# Patient Record
Sex: MALE | Race: WHITE | NOT HISPANIC OR LATINO
[De-identification: names, ages, dates, MRNs, and addresses within clinical notes are randomized per-mention and may not be internally consistent; named-entity substitution may affect disease eponyms.]

---

## 2024-04-22 PROBLEM — Z00.00 ENCOUNTER FOR PREVENTIVE HEALTH EXAMINATION: Status: ACTIVE | Noted: 2024-04-22

## 2024-04-23 ENCOUNTER — TRANSCRIPTION ENCOUNTER (OUTPATIENT)
Age: 29
End: 2024-04-23

## 2024-04-23 ENCOUNTER — APPOINTMENT (OUTPATIENT)
Dept: ORTHOPEDIC SURGERY | Facility: CLINIC | Age: 29
End: 2024-04-23
Payer: COMMERCIAL

## 2024-04-23 VITALS
BODY MASS INDEX: 25.05 KG/M2 | SYSTOLIC BLOOD PRESSURE: 122 MMHG | DIASTOLIC BLOOD PRESSURE: 76 MMHG | HEART RATE: 71 BPM | WEIGHT: 175 LBS | HEIGHT: 70 IN

## 2024-04-23 DIAGNOSIS — M25.569 PAIN IN UNSPECIFIED KNEE: ICD-10-CM

## 2024-04-23 DIAGNOSIS — S83.512A SPRAIN OF ANTERIOR CRUCIATE LIGAMENT OF LEFT KNEE, INITIAL ENCOUNTER: ICD-10-CM

## 2024-04-23 DIAGNOSIS — Z86.59 PERSONAL HISTORY OF OTHER MENTAL AND BEHAVIORAL DISORDERS: ICD-10-CM

## 2024-04-23 DIAGNOSIS — T84.89XA OTHER SPECIFIED COMPLICATION OF INTERNAL ORTHOPEDIC PROSTHETIC DEVICES, IMPLANTS AND GRAFTS, INITIAL ENCOUNTER: ICD-10-CM

## 2024-04-23 PROCEDURE — 99204 OFFICE O/P NEW MOD 45 MIN: CPT

## 2024-04-23 PROCEDURE — 73564 X-RAY EXAM KNEE 4 OR MORE: CPT | Mod: LT

## 2024-04-23 RX ORDER — SERTRALINE HYDROCHLORIDE 50 MG/1
50 TABLET, FILM COATED ORAL
Refills: 0 | Status: ACTIVE | COMMUNITY

## 2024-04-23 NOTE — HISTORY OF PRESENT ILLNESS
[de-identified] : Jozef De La Rosa  is a 28 year old M who presents with a left knee injury.  He has a history of ACL injuries to the left knee.  This initially occurred in 2012 when he was playing basketball.  He says that he had an ACL reconstruction with hamstring autograft at that time.  After recovery he was able to return to running, but says that he was never really an athlete and did not play anything more than recreational sports.  He then had an injury to the left knee in 2019 when he was dancing.  He was diagnosed with a ACL graft tear and underwent revision ACL reconstruction with allograft.  He says that it took about a year to recover from the surgery and he was able to return to running and occasional recreational sports while wearing a brace.  He says that he was doing well until this past Friday when he was dancing and he had a reinjury to his left knee.  He felt the knee pop and he had immediate pain and swelling in the knee.  Since then, he has had some pain in the knee and some swelling as well.

## 2024-04-23 NOTE — DISCUSSION/SUMMARY
[de-identified] : Left knee ACL graft tear.  I discussed with him and his father that based on his history and exam findings, he likely has a torn or deficient ACL graft.  We discussed that because of his exam, I recommend an MRI to evaluate his ACL further.  This will also allow us to evaluate his meniscus and other ligaments.  Based on the results of the MRI, he may need surgery in order to reconstruct the ACL again.  We discussed that in these second time revision scenarios, there is no guarantee about the outcome and he may continue to be at elevated risk for repeat rupture of the ACL.  We briefly discussed the details of surgery, but we did discuss that this will also depend on the findings of the MRI.  He will follow-up after the MRI is complete for further treatment plan.  I have personally obtained the history, reviewed the ROS as noted, and performed the physical examination today. The patient and I discussed the assessment and options and developed the plan. All questions were answered and the patient stated their understanding of the treatment plan and appreciation of the visit.  My cumulative time spent on this patient's visit included: Preparation for the visit, review of the medical records, review of pertinent diagnostic studies, examination and counseling of the patient on the above diagnosis, treatment plan and prognosis, orders of diagnostic tests, medications and/or appropriate procedures and documentation in the medical records of today's visit.  This note was generated using dragon medical dictation software.  A reasonable effort has been made for proofreading its contents, but typos may still remain.  If there are any questions or points of clarification needed please notify my office.

## 2024-04-23 NOTE — PHYSICAL EXAM
[de-identified] : General: No apparent distress Cardiovascular: extremities warm and well-perfused, no cyanosis Pulmonary: non labored respirations   Left hip: No pain with hip range of motion  Left knee: Skin is intact, warm, and dry Motor and sensory intact, lower extremity warm and well-perfused  Disuse atrophy: None Effusion: Mild  Active ROM: 0 to 130 degrees of flexion Passive ROM: Same as active Crepitation: None  Joint Line tenderness: None Patellar facet tenderness: None Patellar tracking: Midline Patellar mobility/apprehension: Normal/none  Ligamentous Exam: 2B Lachman, 2+ anterior drawer, 2+ pivot shift, negative posterior drawer, stable to varus and valgus stress at 0 and 30 degrees of flexion  Alignment: Neutral   Right hip: No pain with hip range of motion  Right knee:  Skin is intact, warm, and dry Motor and sensory intact, lower extremity warm and well-perfused  Disuse atrophy: None Effusion: None  Active ROM: 0 to 130 degrees of flexion Passive ROM: Same as active Crepitation: None  Joint Line tenderness: None Patellar facet tenderness: None Patellar tracking: Midline Patellar mobility/apprehension: Normal/none  Ligamentous Exam: Negative anterior drawer, negative posterior drawer, stable to varus and valgus stress at 0 and 30 degrees of flexion  Alignment: Neutral [de-identified] : 4 views of the left knee obtained today and interpreted by me including AP, flexion AP, lateral, sunrise views demonstrate no acute fracture or dislocation.  There is previous hardware from previous ACL reconstructions  Images reviewed in detail with the patient

## 2024-05-11 ENCOUNTER — OUTPATIENT (OUTPATIENT)
Dept: OUTPATIENT SERVICES | Facility: HOSPITAL | Age: 29
LOS: 1 days | End: 2024-05-11
Payer: COMMERCIAL

## 2024-05-11 ENCOUNTER — RESULT REVIEW (OUTPATIENT)
Age: 29
End: 2024-05-11

## 2024-05-11 ENCOUNTER — APPOINTMENT (OUTPATIENT)
Dept: MRI IMAGING | Facility: CLINIC | Age: 29
End: 2024-05-11
Payer: COMMERCIAL

## 2024-05-11 DIAGNOSIS — T84.89XA OTHER SPECIFIED COMPLICATION OF INTERNAL ORTHOPEDIC PROSTHETIC DEVICES, IMPLANTS AND GRAFTS, INITIAL ENCOUNTER: ICD-10-CM

## 2024-05-11 DIAGNOSIS — S83.512A SPRAIN OF ANTERIOR CRUCIATE LIGAMENT OF LEFT KNEE, INITIAL ENCOUNTER: ICD-10-CM

## 2024-05-11 PROCEDURE — 73721 MRI JNT OF LWR EXTRE W/O DYE: CPT

## 2024-05-11 PROCEDURE — 73721 MRI JNT OF LWR EXTRE W/O DYE: CPT | Mod: 26,LT

## 2024-05-20 ENCOUNTER — APPOINTMENT (OUTPATIENT)
Dept: ORTHOPEDIC SURGERY | Facility: CLINIC | Age: 29
End: 2024-05-20
Payer: COMMERCIAL

## 2024-05-20 VITALS — SYSTOLIC BLOOD PRESSURE: 127 MMHG | HEART RATE: 82 BPM | DIASTOLIC BLOOD PRESSURE: 66 MMHG

## 2024-05-20 PROCEDURE — 99214 OFFICE O/P EST MOD 30 MIN: CPT

## 2024-05-20 NOTE — HISTORY OF PRESENT ILLNESS
[de-identified] : Interval history: 5/20/2024: He returns for follow-up of his left knee.  He says that since the last visit his pain has decreased.  He has been able to return to weightlifting, but has avoided doing higher impact activities such as running and tennis.  He says that he did feel a slight buckling episode of the knee once when he was running up stairs.  Other than this he has not had any buckling episodes of the knee.  He also denies any significant swelling about the knee.  He reports that for about a year now he has had some mild anterior knee pain with activity and thinks that he may have had a buckling episode during tennis about a year ago.  4/23/2024: Jozef De La Rosa  is a 28 year old M who presents with a left knee injury.  He has a history of ACL injuries to the left knee.  This initially occurred in 2012 when he was playing basketball.  He says that he had an ACL reconstruction with hamstring autograft at that time.  After recovery he was able to return to running, but says that he was never really an athlete and did not play anything more than recreational sports.  He then had an injury to the left knee in 2019 when he was dancing.  He was diagnosed with a ACL graft tear and underwent revision ACL reconstruction with allograft.  He says that it took about a year to recover from the surgery and he was able to return to running and occasional recreational sports while wearing a brace.  He says that he was doing well until this past Friday when he was dancing and he had a reinjury to his left knee.  He felt the knee pop and he had immediate pain and swelling in the knee.  Since then, he has had some pain in the knee and some swelling as well.

## 2024-05-20 NOTE — PHYSICAL EXAM
[de-identified] : General: No apparent distress Cardiovascular: extremities warm and well-perfused, no cyanosis Pulmonary: non labored respirations   Left hip: No pain with hip range of motion  Left knee: Skin is intact, warm, and dry Motor and sensory intact, lower extremity warm and well-perfused  Disuse atrophy: None Effusion: None  Active ROM: 0 to 130 degrees of flexion Passive ROM: Same as active Crepitation: None  Joint Line tenderness: None Patellar facet tenderness: None Patellar tracking: Midline Patellar mobility/apprehension: Normal/none  Ligamentous Exam: 2B Lachman, 2+ anterior drawer, 2+ pivot shift, negative posterior drawer, stable to varus and valgus stress at 0 and 30 degrees of flexion  Alignment: Neutral   Right hip: No pain with hip range of motion  Right knee:  Skin is intact, warm, and dry Motor and sensory intact, lower extremity warm and well-perfused  Disuse atrophy: None Effusion: None  Active ROM: 0 to 130 degrees of flexion Passive ROM: Same as active Crepitation: None  Joint Line tenderness: None Patellar facet tenderness: None Patellar tracking: Midline Patellar mobility/apprehension: Normal/none  Ligamentous Exam: Negative anterior drawer, negative posterior drawer, stable to varus and valgus stress at 0 and 30 degrees of flexion  Alignment: Neutral [de-identified] : MRI images reviewed with patient

## 2024-05-20 NOTE — DISCUSSION/SUMMARY
[de-identified] : Left knee failed ACL reconstruction.  I discussed with him that based on my reading of the MRI and based on my clinical examination, I do not believe that he has a functional ACL graft.  On the MRI, it appears that his graft is attenuated and while there may be some portion of the graft intact, clinically, his ACL does not appear to be functional.  In addition, he does have osseous contusions that may indicate a pivot shift bone bruise pattern which would indicate a nonfunctional ACL as well.  Therefore, I discussed with him that I do believe that his ACL reconstruction has failed.  We had a detailed discussion about ongoing treatment options.  We discussed that with nonoperative treatment, he could do physical therapy and could potentially return to running.  He could potentially try to play pivoting sports such as tennis, but we discussed that he would be at risk of buckling episodes of the knee which may cause meniscus and cartilage damage.  I would recommend that he wear a brace when he runs or plays tennis in order to help decrease these episodes if he chooses this option.  We also discussed surgical treatment.  This would include revision ACL reconstruction.  Based on the status of his tunnels, he may need a two-stage reconstruction with the first stage being bone grafting followed by full revision reconstruction.  In addition, we would consider his tibial slope and determine if he needs an osteotomy to correct increased tibial slope as well.  Since he has had 2 prior ACL reconstructions, we discussed that with her ACL reconstruction may not have as predictable outcomes.  The rehab would also be longer for this second time revision ACL as well.  He is unsure of how he would like to proceed.  For now, he would like to continue with physical therapy and will try to return to running over the next few weeks using a brace.  We also discussed that he is likely at risk of developing arthritis in the knee with or without surgery.  He will follow-up in 6 weeks after doing physical therapy for repeat evaluation.  I have personally obtained the history, reviewed the ROS as noted, and performed the physical examination today. The patient and I discussed the assessment and options and developed the plan. All questions were answered and the patient stated their understanding of the treatment plan and appreciation of the visit.  My cumulative time spent on this patient's visit included: Preparation for the visit, review of the medical records, review of pertinent diagnostic studies, examination and counseling of the patient on the above diagnosis, treatment plan and prognosis, orders of diagnostic tests, medications and/or appropriate procedures and documentation in the medical records of today's visit.  This note was generated using dragon medical dictation software.  A reasonable effort has been made for proofreading its contents, but typos may still remain.  If there are any questions or points of clarification needed please notify my office.

## 2024-06-17 ENCOUNTER — APPOINTMENT (OUTPATIENT)
Dept: ORTHOPEDIC SURGERY | Facility: CLINIC | Age: 29
End: 2024-06-17
Payer: COMMERCIAL

## 2024-06-17 VITALS — HEART RATE: 66 BPM | SYSTOLIC BLOOD PRESSURE: 119 MMHG | DIASTOLIC BLOOD PRESSURE: 74 MMHG

## 2024-06-17 PROCEDURE — 99214 OFFICE O/P EST MOD 30 MIN: CPT

## 2024-06-17 NOTE — PHYSICAL EXAM
[de-identified] : General: No apparent distress Cardiovascular: extremities warm and well-perfused, no cyanosis Pulmonary: non labored respirations   Left hip: No pain with hip range of motion  Left knee: Skin is intact, warm, and dry Motor and sensory intact, lower extremity warm and well-perfused  Disuse atrophy: None Effusion: None  Active ROM: 0 to 130 degrees of flexion Passive ROM: Same as active Crepitation: None  Joint Line tenderness: None Patellar facet tenderness: None Patellar tracking: Midline Patellar mobility/apprehension: Normal/none  Ligamentous Exam: 2B Lachman, 2+ anterior drawer, 2+ pivot shift, negative posterior drawer, stable to varus and valgus stress at 0 and 30 degrees of flexion  Alignment: Neutral   Right hip: No pain with hip range of motion  Right knee:  Skin is intact, warm, and dry Motor and sensory intact, lower extremity warm and well-perfused  Disuse atrophy: None Effusion: None  Active ROM: 0 to 130 degrees of flexion Passive ROM: Same as active Crepitation: None  Joint Line tenderness: None Patellar facet tenderness: None Patellar tracking: Midline Patellar mobility/apprehension: Normal/none  Ligamentous Exam: Negative anterior drawer, negative posterior drawer, stable to varus and valgus stress at 0 and 30 degrees of flexion  Alignment: Neutral

## 2024-06-17 NOTE — HISTORY OF PRESENT ILLNESS
[de-identified] : Interval history: 6/17/24: He returns for follow-up of his left knee.  He says that since last visit he has been doing well and he has been able to run and play tennis.  He has not had any further buckling episodes of the knee.  He says overall the pain is better than before.  He does report a small amount of swelling about the knee as well.  5/20/2024: He returns for follow-up of his left knee.  He says that since the last visit his pain has decreased.  He has been able to return to weightlifting, but has avoided doing higher impact activities such as running and tennis.  He says that he did feel a slight buckling episode of the knee once when he was running up stairs.  Other than this he has not had any buckling episodes of the knee.  He also denies any significant swelling about the knee.  He reports that for about a year now he has had some mild anterior knee pain with activity and thinks that he may have had a buckling episode during tennis about a year ago.  4/23/2024: Jozef De La Rosa  is a 28 year old M who presents with a left knee injury.  He has a history of ACL injuries to the left knee.  This initially occurred in 2012 when he was playing basketball.  He says that he had an ACL reconstruction with hamstring autograft at that time.  After recovery he was able to return to running, but says that he was never really an athlete and did not play anything more than recreational sports.  He then had an injury to the left knee in 2019 when he was dancing.  He was diagnosed with a ACL graft tear and underwent revision ACL reconstruction with allograft.  He says that it took about a year to recover from the surgery and he was able to return to running and occasional recreational sports while wearing a brace.  He says that he was doing well until this past Friday when he was dancing and he had a reinjury to his left knee.  He felt the knee pop and he had immediate pain and swelling in the knee.  Since then, he has had some pain in the knee and some swelling as well.

## 2024-06-17 NOTE — DISCUSSION/SUMMARY
[de-identified] : Left knee failed ACL reconstruction.  -We again discussed the details of the diagnosis -Discussed that if he is able to do the activities that he would like to do and does not have significant pain or buckling episodes, he can continue with nonoperative treatment -Recommend that he wear a brace while playing sports -Discussed that if he does have any buckling episodes or he can no longer do the activities that he would like to do, he should return for follow-up and possible surgery -We will obtain a CT scan of the left knee to evaluate the tunnels further in order to give him a more complete view of what surgery may entail including possible two-stage surgery, possible osteotomy -Follow-up after CT scan is complete -I have personally obtained the history, reviewed the ROS as noted, and performed the physical examination today. The patient and I discussed the assessment and options and developed the plan. All questions were answered and the patient stated their understanding of the treatment plan and appreciation of the visit.  My cumulative time spent on this patient's visit included: Preparation for the visit, review of the medical records, review of pertinent diagnostic studies, examination and counseling of the patient on the above diagnosis, treatment plan and prognosis, orders of diagnostic tests, medications and/or appropriate procedures and documentation in the medical records of today's visit.  This note was generated using dragon medical dictation software.  A reasonable effort has been made for proofreading its contents, but typos may still remain.  If there are any questions or points of clarification needed please notify my office.

## 2024-10-02 ENCOUNTER — NON-APPOINTMENT (OUTPATIENT)
Age: 29
End: 2024-10-02

## 2024-10-03 ENCOUNTER — APPOINTMENT (OUTPATIENT)
Dept: ORTHOPEDIC SURGERY | Facility: CLINIC | Age: 29
End: 2024-10-03
Payer: COMMERCIAL

## 2024-10-03 VITALS — HEIGHT: 70 IN | BODY MASS INDEX: 25.05 KG/M2 | WEIGHT: 175 LBS

## 2024-10-03 DIAGNOSIS — M25.562 PAIN IN LEFT KNEE: ICD-10-CM

## 2024-10-03 DIAGNOSIS — G89.29 PAIN IN LEFT KNEE: ICD-10-CM

## 2024-10-03 PROCEDURE — 73564 X-RAY EXAM KNEE 4 OR MORE: CPT | Mod: 26,LT

## 2024-10-03 PROCEDURE — 99203 OFFICE O/P NEW LOW 30 MIN: CPT

## 2024-10-04 ENCOUNTER — APPOINTMENT (OUTPATIENT)
Dept: MRI IMAGING | Facility: CLINIC | Age: 29
End: 2024-10-04